# Patient Record
Sex: MALE | Race: BLACK OR AFRICAN AMERICAN
[De-identification: names, ages, dates, MRNs, and addresses within clinical notes are randomized per-mention and may not be internally consistent; named-entity substitution may affect disease eponyms.]

---

## 2020-04-20 ENCOUNTER — HOSPITAL ENCOUNTER (EMERGENCY)
Dept: HOSPITAL 92 - ERS | Age: 50
Discharge: HOME | End: 2020-04-20
Payer: COMMERCIAL

## 2020-04-20 DIAGNOSIS — M25.471: Primary | ICD-10-CM

## 2020-04-20 PROCEDURE — 96372 THER/PROPH/DIAG INJ SC/IM: CPT

## 2020-04-20 PROCEDURE — 29515 APPLICATION SHORT LEG SPLINT: CPT

## 2020-04-20 NOTE — RAD
THREE VIEWS RIGHT ANKLE:

 

Comparison: None. 

 

History: Right ankle pain. 

 

FINDINGS: 

Three views of the right ankle shows no evidence of acute fracture or dislocation. Mild diffuse soft 
tissue swelling is seen. There may be a small ankle effusion. 

 

IMPRESSION: 

No evidence of acute osseous abnormality.  

 

POS: [facility]

## 2022-07-10 ENCOUNTER — HOSPITAL ENCOUNTER (EMERGENCY)
Dept: HOSPITAL 92 - CSHERS | Age: 52
Discharge: HOME | End: 2022-07-10
Payer: COMMERCIAL

## 2022-07-10 DIAGNOSIS — M19.071: Primary | ICD-10-CM

## 2022-10-29 ENCOUNTER — HOSPITAL ENCOUNTER (EMERGENCY)
Dept: HOSPITAL 92 - CSHERS | Age: 52
Discharge: HOME | End: 2022-10-29
Payer: COMMERCIAL

## 2022-10-29 DIAGNOSIS — M79.675: Primary | ICD-10-CM

## 2022-10-29 PROCEDURE — 96372 THER/PROPH/DIAG INJ SC/IM: CPT
